# Patient Record
Sex: FEMALE | Race: BLACK OR AFRICAN AMERICAN | URBAN - METROPOLITAN AREA
[De-identification: names, ages, dates, MRNs, and addresses within clinical notes are randomized per-mention and may not be internally consistent; named-entity substitution may affect disease eponyms.]

---

## 2020-06-10 ENCOUNTER — OFFICE VISIT (OUTPATIENT)
Dept: URBAN - METROPOLITAN AREA SURGERY CENTER 20 | Facility: SURGERY CENTER | Age: 40
End: 2020-06-10

## 2020-06-12 ENCOUNTER — ERX REFILL RESPONSE (OUTPATIENT)
Age: 40
End: 2020-06-12

## 2020-06-12 RX ORDER — HYOSCYAMINE SULFATE 0.12 MG/1
TAKE 1 TABLET BY MOUTH TWICE A DAY TABLET ORAL
Qty: 60 | Refills: 0

## 2020-06-25 ENCOUNTER — OFFICE VISIT (OUTPATIENT)
Dept: URBAN - METROPOLITAN AREA SURGERY CENTER 20 | Facility: SURGERY CENTER | Age: 40
End: 2020-06-25

## 2020-06-25 ENCOUNTER — OFFICE VISIT (OUTPATIENT)
Dept: URBAN - METROPOLITAN AREA CLINIC 98 | Facility: CLINIC | Age: 40
End: 2020-06-25

## 2020-07-01 ENCOUNTER — TELEPHONE ENCOUNTER (OUTPATIENT)
Dept: URBAN - METROPOLITAN AREA CLINIC 84 | Facility: CLINIC | Age: 40
End: 2020-07-01

## 2020-07-08 ENCOUNTER — OFFICE VISIT (OUTPATIENT)
Dept: URBAN - METROPOLITAN AREA SURGERY CENTER 20 | Facility: SURGERY CENTER | Age: 40
End: 2020-07-08
Payer: MEDICAID

## 2020-07-08 DIAGNOSIS — R68.81 EARLY SATIETY: ICD-10-CM

## 2020-07-08 DIAGNOSIS — R14.0 ABDOMINAL BLOATING: ICD-10-CM

## 2020-07-08 PROCEDURE — G8907 PT DOC NO EVENTS ON DISCHARG: HCPCS | Performed by: INTERNAL MEDICINE

## 2020-07-08 PROCEDURE — 43235 EGD DIAGNOSTIC BRUSH WASH: CPT | Performed by: INTERNAL MEDICINE

## 2020-07-08 RX ORDER — PANTOPRAZOLE SODIUM 40 MG/1
TAKE 1 TABLET (40 MG) BY ORAL ROUTE ONCE DAILY FOR 30 DAYS TABLET, DELAYED RELEASE ORAL 1
Qty: 30 | Refills: 3 | Status: ACTIVE | COMMUNITY
Start: 2020-05-21 | End: 2020-09-18

## 2020-07-08 RX ORDER — HYOSCYAMINE SULFATE 0.12 MG/1
TAKE 1 TABLET BY MOUTH TWICE A DAY TABLET ORAL
Qty: 60 | Refills: 0 | Status: ACTIVE | COMMUNITY

## 2020-07-13 ENCOUNTER — OFFICE VISIT (OUTPATIENT)
Dept: URBAN - METROPOLITAN AREA CLINIC 98 | Facility: CLINIC | Age: 40
End: 2020-07-13

## 2020-07-13 RX ORDER — PANTOPRAZOLE SODIUM 40 MG/1
TAKE 1 TABLET (40 MG) BY ORAL ROUTE ONCE DAILY FOR 30 DAYS TABLET, DELAYED RELEASE ORAL 1
Qty: 30 | Refills: 3 | Status: ACTIVE | COMMUNITY
Start: 2020-05-21 | End: 2020-09-18

## 2020-07-13 RX ORDER — HYOSCYAMINE SULFATE 0.12 MG/1
TAKE 1 TABLET BY MOUTH TWICE A DAY TABLET ORAL
Qty: 60 | Refills: 0 | Status: ACTIVE | COMMUNITY

## 2020-07-14 ENCOUNTER — OFFICE VISIT (OUTPATIENT)
Dept: URBAN - METROPOLITAN AREA CLINIC 84 | Facility: CLINIC | Age: 40
End: 2020-07-14

## 2020-08-06 ENCOUNTER — LAB OUTSIDE AN ENCOUNTER (OUTPATIENT)
Dept: URBAN - METROPOLITAN AREA CLINIC 118 | Facility: CLINIC | Age: 40
End: 2020-08-06

## 2020-08-06 ENCOUNTER — OFFICE VISIT (OUTPATIENT)
Dept: URBAN - METROPOLITAN AREA CLINIC 118 | Facility: CLINIC | Age: 40
End: 2020-08-06
Payer: MEDICAID

## 2020-08-06 ENCOUNTER — DASHBOARD ENCOUNTERS (OUTPATIENT)
Age: 40
End: 2020-08-06

## 2020-08-06 DIAGNOSIS — K21.9 GERD: ICD-10-CM

## 2020-08-06 DIAGNOSIS — R14.3 GAS AND BLOATING: ICD-10-CM

## 2020-08-06 PROCEDURE — 99214 OFFICE O/P EST MOD 30 MIN: CPT | Performed by: INTERNAL MEDICINE

## 2020-08-06 RX ORDER — PANTOPRAZOLE SODIUM 40 MG/1
TAKE 1 TABLET (40 MG) BY ORAL ROUTE ONCE DAILY FOR 30 DAYS TABLET, DELAYED RELEASE ORAL 1
Qty: 30 | Refills: 3 | Status: ACTIVE | COMMUNITY
Start: 2020-05-21 | End: 2020-09-18

## 2020-08-06 RX ORDER — DICYCLOMINE HYDROCHLORIDE 20 MG/1
1 TABLET TABLET ORAL
Qty: 90 | Refills: 5 | OUTPATIENT
Start: 2020-08-06 | End: 2021-02-01

## 2020-08-06 RX ORDER — HYOSCYAMINE SULFATE 0.12 MG/1
TAKE 1 TABLET BY MOUTH TWICE A DAY TABLET ORAL
Qty: 60 | Refills: 0 | Status: DISCONTINUED | COMMUNITY

## 2020-08-06 NOTE — HPI-TODAY'S VISIT:
pt reports 2 months gas, bloating upper abdominal and chest area. Reports started after COVID country issues started. Reports went to ED, told gerd but meds didn't help. Reports cardiac eval negative. Reports 40 lbs weight loss recent months. Reports stress. Denies nsaids/asa. Reports otc anti-gas meds did not help. Denies LGI symptoms.

## 2020-08-18 ENCOUNTER — OFFICE VISIT (OUTPATIENT)
Dept: URBAN - METROPOLITAN AREA CLINIC 118 | Facility: CLINIC | Age: 40
End: 2020-08-18

## 2020-08-21 ENCOUNTER — OFFICE VISIT (OUTPATIENT)
Dept: URBAN - METROPOLITAN AREA SURGERY CENTER 23 | Facility: SURGERY CENTER | Age: 40
End: 2020-08-21

## 2020-08-25 ENCOUNTER — OFFICE VISIT (OUTPATIENT)
Dept: URBAN - METROPOLITAN AREA CLINIC 118 | Facility: CLINIC | Age: 40
End: 2020-08-25

## 2020-09-04 ENCOUNTER — OFFICE VISIT (OUTPATIENT)
Dept: URBAN - METROPOLITAN AREA SURGERY CENTER 23 | Facility: SURGERY CENTER | Age: 40
End: 2020-09-04
Payer: MEDICAID

## 2020-09-04 DIAGNOSIS — K22.8 COLUMNAR-LINED ESOPHAGUS: ICD-10-CM

## 2020-09-04 DIAGNOSIS — K29.60 ADENOPAPILLOMATOSIS GASTRICA: ICD-10-CM

## 2020-09-04 PROCEDURE — G8907 PT DOC NO EVENTS ON DISCHARG: HCPCS | Performed by: INTERNAL MEDICINE

## 2020-09-04 PROCEDURE — 43239 EGD BIOPSY SINGLE/MULTIPLE: CPT | Performed by: INTERNAL MEDICINE
